# Patient Record
Sex: MALE | Race: WHITE
[De-identification: names, ages, dates, MRNs, and addresses within clinical notes are randomized per-mention and may not be internally consistent; named-entity substitution may affect disease eponyms.]

---

## 2021-10-14 ENCOUNTER — HOSPITAL ENCOUNTER (EMERGENCY)
Dept: HOSPITAL 97 - ER | Age: 51
Discharge: HOME | End: 2021-10-14
Payer: COMMERCIAL

## 2021-10-14 VITALS — OXYGEN SATURATION: 99 % | DIASTOLIC BLOOD PRESSURE: 95 MMHG | SYSTOLIC BLOOD PRESSURE: 141 MMHG

## 2021-10-14 VITALS — TEMPERATURE: 98.7 F

## 2021-10-14 DIAGNOSIS — I10: Primary | ICD-10-CM

## 2021-10-14 LAB
ALBUMIN SERPL BCP-MCNC: 4.2 G/DL (ref 3.4–5)
ALP SERPL-CCNC: 60 U/L (ref 45–117)
ALT SERPL W P-5'-P-CCNC: 49 U/L (ref 12–78)
AST SERPL W P-5'-P-CCNC: 17 U/L (ref 15–37)
BUN BLD-MCNC: 20 MG/DL (ref 7–18)
GLUCOSE SERPLBLD-MCNC: 126 MG/DL (ref 74–106)
HCT VFR BLD CALC: 44.5 % (ref 39.6–49)
INR BLD: 1.04
LIPASE SERPL-CCNC: 143 U/L (ref 73–393)
LYMPHOCYTES # SPEC AUTO: 1.8 K/UL (ref 0.7–4.9)
MAGNESIUM SERPL-MCNC: 2.3 MG/DL (ref 1.8–2.4)
NT-PROBNP SERPL-MCNC: 11 PG/ML (ref ?–125)
PMV BLD: 7.6 FL (ref 7.6–11.3)
POTASSIUM SERPL-SCNC: 4.1 MMOL/L (ref 3.5–5.1)
RBC # BLD: 5.24 M/UL (ref 4.33–5.43)
TROPONIN (EMERG DEPT USE ONLY): < 0.02 NG/ML (ref 0–0.04)

## 2021-10-14 PROCEDURE — 93005 ELECTROCARDIOGRAM TRACING: CPT

## 2021-10-14 PROCEDURE — 85025 COMPLETE CBC W/AUTO DIFF WBC: CPT

## 2021-10-14 PROCEDURE — 74175 CTA ABDOMEN W/CONTRAST: CPT

## 2021-10-14 PROCEDURE — 96374 THER/PROPH/DIAG INJ IV PUSH: CPT

## 2021-10-14 PROCEDURE — 71275 CT ANGIOGRAPHY CHEST: CPT

## 2021-10-14 PROCEDURE — 83690 ASSAY OF LIPASE: CPT

## 2021-10-14 PROCEDURE — 36415 COLL VENOUS BLD VENIPUNCTURE: CPT

## 2021-10-14 PROCEDURE — 93306 TTE W/DOPPLER COMPLETE: CPT

## 2021-10-14 PROCEDURE — 84484 ASSAY OF TROPONIN QUANT: CPT

## 2021-10-14 PROCEDURE — 71045 X-RAY EXAM CHEST 1 VIEW: CPT

## 2021-10-14 PROCEDURE — 80048 BASIC METABOLIC PNL TOTAL CA: CPT

## 2021-10-14 PROCEDURE — 83880 ASSAY OF NATRIURETIC PEPTIDE: CPT

## 2021-10-14 PROCEDURE — 83735 ASSAY OF MAGNESIUM: CPT

## 2021-10-14 PROCEDURE — 99285 EMERGENCY DEPT VISIT HI MDM: CPT

## 2021-10-14 PROCEDURE — 85610 PROTHROMBIN TIME: CPT

## 2021-10-14 PROCEDURE — 80076 HEPATIC FUNCTION PANEL: CPT

## 2021-10-14 NOTE — RAD REPORT
EXAM DESCRIPTION:  CT - Angio  Aorta For Dissection - 10/14/2021 4:02 pm

 

CLINICAL HISTORY:  Chest pain radiating to the back.

Dissection;PE

 

COMPARISON:  No comparisons

 

TECHNIQUE:  CT angiography of the aorta was performed with MIPs.

 

All CT scans are performed using dose optimization technique as appropriate and may include automated
 exposure control or mA/KV adjustment according to patient size.

 

FINDINGS:  A left aortic arch is present with normal branching pattern of the great vessels.No acute 
aortic finding is seen such as aneurysm, penetrating ulcer or dissection.  The celiac axis, SMA, EMMANUEL 
and renal arteries are patent.

 

No evidence of pulmonary embolism.

 

The lungs are clear.

 

The liver demonstrates no focal mass or biliary dilatation.Small fat containing umbilical hernia.The 
spleen, pancreas, adrenal glands and kidneys are within normal limits for arterial phase imaging.

 

No bowel obstruction, free fluid or abscess.Normal appendix.No pathologic enlarged lymphadenopathy id
entified.

 

Lower lumbar degenerative changes are present.

 

 

IMPRESSION:  No acute aortic finding is demonstrated.

## 2021-10-14 NOTE — EDPHYS
Physician Documentation                                                                           

 [facility]                                                                 

Name: Abundio Kapoor                                                                                

Age: 51 yrs                                                                                       

Sex: Male                                                                                         

: 1970                                                                                   

MRN: [id number]                                                                                   

Arrival Date: 10/14/2021                                                                          

Time: 12:09                                                                                       

Account#: [id number]                                                                            

Bed 26                                                                                            

Private MD:                                                                                       

Brayden Hdez                                                                      

HPI:                                                                                              

10/14                                                                                             

12:12 This 51 yrs old  Male presents to ER via [facility] with complaints of CHEST   otto 

      PAIN, PALPITATIONS.                                                                         

12:12 The patient or guardian reports chest pain that is located primarily in the substernal  otto 

      area. Onset: 1 week(s) ago. The patient presents with a history of irregular heart          

      beat, heart skipping beats. Context: The symptoms occur with light activity. Onset: The     

      symptoms/episode began/occurred 1 week(s) ago. Duration: The patient or guardian            

      reports multiple episodes, with no pattern. Modifying factors: The symptoms are             

      aggravated by nothing. The symptoms are alleviated by nothing. The pain does not            

      radiate. Associated signs and symptoms: Pertinent positives: chest pain,                    

      lightheadedness. The chest pain is described as sharp.                                      

                                                                                                  

Historical:                                                                                       

- Allergies:                                                                                      

12:35 No Known Allergies;                                                                     ld1 

- Home Meds:                                                                                      

12:35 None [Active];                                                                          ld1 

- PMHx:                                                                                           

12:35 None;                                                                                   ld1 

- PSHx:                                                                                           

12:35 None;                                                                                   ld1 

                                                                                                  

- Immunization history:: Adult Immunizations up to date, Client reports receiving the             

  2nd dose of the Covid vaccine.                                                                  

- Social history:: Smoking status: Patient denies any tobacco usage or history of.                

- Family history:: not pertinent.                                                                 

                                                                                                  

                                                                                                  

ROS:                                                                                              

12:12 Constitutional: Negative for fever, chills, and weight loss, Eyes: Negative for injury, otto 

      pain, redness, and discharge, ENT: Negative for injury, pain, and discharge, Neck:          

      Negative for injury, pain, and swelling, Respiratory: Negative for shortness of breath,     

      cough, wheezing, and pleuritic chest pain, Abdomen/GI: Negative for abdominal pain,         

      nausea, vomiting, diarrhea, and constipation, Back: Negative for injury and pain, :       

      Negative for injury, bleeding, discharge, and swelling, MS/Extremity: Negative for          

      injury and deformity, Skin: Negative for injury, rash, and discoloration, Neuro:            

      Negative for headache, weakness, numbness, tingling, and seizure, Psych: Negative for       

      depression, anxiety, suicide ideation, homicidal ideation, and hallucinations,              

      Allergy/Immunology: Negative for hives, rash, and allergies, Endocrine: Negative for        

      neck swelling, polydipsia, polyuria, polyphagia, and marked weight changes,                 

      Hematologic/Lymphatic: Negative for swollen nodes, abnormal bleeding, and unusual           

      bruising.                                                                                   

12:12 Cardiovascular: Positive for chest pain, with cough.                                        

                                                                                                  

Exam:                                                                                             

12:12 Constitutional:  This is a well developed, well nourished patient who is awake, alert,  otto 

      and in no acute distress. Head/Face:  Normocephalic, atraumatic. Eyes:  Pupils equal        

      round and reactive to light, extra-ocular motions intact.  Lids and lashes normal.          

      Conjunctiva and sclera are non-icteric and not injected.  Cornea within normal limits.      

      Periorbital areas with no swelling, redness, or edema. ENT:  Nares patent. No nasal         

      discharge, no septal abnormalities noted.  Tympanic membranes are normal and external       

      auditory canals are clear.  Oropharynx with no redness, swelling, or masses, exudates,      

      or evidence of obstruction, uvula midline.  Mucous membranes moist. Neck:  Trachea          

      midline, no thyromegaly or masses palpated, and no cervical lymphadenopathy.  Supple,       

      full range of motion without nuchal rigidity, or vertebral point tenderness.  No            

      Meningismus. Chest/axilla:  Normal chest wall appearance and motion.  Nontender with no     

      deformity.  No lesions are appreciated. Cardiovascular:  Regular rate and rhythm with a     

      normal S1 and S2.  No gallops, murmurs, or rubs.  Normal PMI, no JVD.  No pulse             

      deficits. Respiratory:  Lungs have equal breath sounds bilaterally, clear to                

      auscultation and percussion.  No rales, rhonchi or wheezes noted.  No increased work of     

      breathing, no retractions or nasal flaring. Abdomen/GI:  Soft, non-tender, with normal      

      bowel sounds.  No distension or tympany.  No guarding or rebound.  No evidence of           

      tenderness throughout. Back:  No spinal tenderness.  No costovertebral tenderness.          

      Full range of motion. Male :  Normal genitalia with no discharge or lesions. Skin:        

      Warm, dry with normal turgor.  Normal color with no rashes, no lesions, and no evidence     

      of cellulitis. MS/ Extremity:  Pulses equal, no cyanosis.  Neurovascular intact.  Full,     

      normal range of motion. Neuro:  Awake and alert, GCS 15, oriented to person, place,         

      time, and situation.  Cranial nerves II-XII grossly intact.  Motor strength 5/5 in all      

      extremities.  Sensory grossly intact.  Cerebellar exam normal.  Normal gait. Psych:         

      Awake, alert, with orientation to person, place and time.  Behavior, mood, and affect       

      are within normal limits.                                                                   

12:12 Musculoskeletal/extremity: Circulation is intact in all extremities. Sensation intact.      

      Compartment Syndrome exam of affected extremity: is normal. Joints: All joints appear       

      normal with full range of motion. DVT Exam: No signs of deep vein thrombosis. no pain,      

      no swelling, no tenderness, negative Homans' sign noted on exam, no appreciated bluish      

      discoloration, no erythema, no increased warmth.                                            

12:38 ECG was reviewed by the Attending Physician.                                            [facility] 

16:25 ECG was reviewed by the Attending Physician.                                            [facility] 

                                                                                                  

Vital Signs:                                                                                      

12:32  / 93; Pulse 68; Resp 18; Temp 98.7(O); Pulse Ox 100% on R/A; Weight 86.18 kg;    ld1 

      Height 5 ft. 9 in. (175.26 cm); Pain 0/10;                                                  

12:50  / 85; Pulse 72; Resp 18; Pulse Ox 100% on R/A;                                   ld1 

14:40  / 95; Pulse 67; Resp 18; Pulse Ox 99% ;                                          ld1 

12:32 Body Mass Index 28.06 (86.18 kg, 175.26 cm)                                             ld1 

                                                                                                  

MDM:                                                                                              

12:11 Patient medically screened.                                                             otto 

12:14 Differential diagnosis: abnormal EKG, coronary artery disease chest wall pain,          otto 

      congestive heart failure costochondritis, arrythmia, hiatal hernia, pancreatitis,           

      pulmonary embolus, stable angina, unstable angina. HEART Score: History: Slightly           

      Suspicious (0), ECG: Normal (0), Age: > 45 and < 65 years (1), Risk Factors: No Risk        

      Factors Known (0), Troponin: < or = 1 x Normal Limit (0). The patient was given aspirin     

      in the Emergency Department. The patient's deep vein thrombosis risk score was              

      calculated as follows: Total Score: 0. This patient was found to be at low risk for a       

      deep vein thrombosis by using the Well's assessment criteria. The patient's pulmonary       

      embolism risk score was calculated as follows: Total Score: 0-2 points. This patient        

      was found to be at low risk for a pulmonary embolism by using the Well's assessment         

      criteria. CRYSTAL Risk Score: TOTAL SCORE = 0. Data reviewed: vital signs, nurses notes,       

      lab test result(s), EKG, radiologic studies, plain films. Data interpreted: Cardiac         

      monitor: rate is 65 beats/min, rhythm is regular, Pulse oximetry: on room air is 96 %.      

                                                                                                  

10/14                                                                                             

12:12 Order name: Basic Metabolic Panel; Complete Time: 13:06                                 [facility] 

10/14                                                                                             

12:12 Order name: CBC with Diff; Complete Time: 13:06                                         [facility] 

10/14                                                                                             

12:12 Order name: LFT's; Complete Time: 13:06                                                 [facility] 

10/14                                                                                             

12:12 Order name: Magnesium; Complete Time: 13:06                                             [facility] 

10/14                                                                                             

12:12 Order name: NT PRO-BNP; Complete Time: 13:06                                            [facility] 

10/14                                                                                             

12:12 Order name: PT-INR; Complete Time: 13:06                                                [facility] 

10/14                                                                                             

12:12 Order name: Troponin (emerg Dept Use Only); Complete Time: 13:06                        [facility] 

10/14                                                                                             

12:12 Order name: XRAY Chest (1 view); Complete Time: 15:14                                   [facility] 

10/14                                                                                             

12:12 Order name: Lipase; Complete Time: 13:06                                                [facility] 

10/14                                                                                             

13:13 Order name: Troponin (Emerg Dept Use Only); Complete Time: 15:14                        [facility]

10/14                                                                                             

13:49 Order name: Echo w/ Doppler                                                               

10/14                                                                                             

15:24 Order name: CT Aorta for Dissection                                                     [facility] 

10/14                                                                                             

15:24 Order name: Troponin (emerg Dept Use Only): 400PM                                       [facility] 

10/14                                                                                             

12:12 Order name: EKG; Complete Time: 12:12                                                   [facility] 

10/14                                                                                             

12:12 Order name: Cardiac monitoring; Complete Time: 12:16                                    [facility] 

10/14                                                                                             

12:12 Order name: EKG - Nurse/Tech; Complete Time: 12:16                                      [facility] 

10/14                                                                                             

12:12 Order name: IV Saline Lock; Complete Time: 12:16                                        [facility] 

10/14                                                                                             

12:12 Order name: Labs collected and sent; Complete Time: 12:16                               [facility] 

10/14                                                                                             

12:12 Order name: O2 Per Protocol; Complete Time: 12:16                                       [facility] 

10/14                                                                                             

12:12 Order name: O2 Sat Monitoring; Complete Time: 12:16                                     [facility] 

                                                                                                  

EC:38 Rate is 63 beats/min. Rhythm is regular. QRS Axis is Normal. RI interval is normal. QRS otto 

      interval is normal. QT interval is normal. No Q waves. T waves are Normal. No ST            

      changes noted. Clinical impression: Normal ECG and No evidence of ischemia. Interpreted     

      by me. Reviewed by me.                                                                      

16:25 Rate is 61 beats/min. Rhythm is regular. QRS Axis is Normal. RI interval is normal. QRS otto 

      interval is normal. QT interval is normal. No Q waves. T waves are Normal. No ST            

      changes noted. Clinical impression: Normal ECG and No evidence of ischemia. Interpreted     

      by me. Reviewed by me.                                                                      

                                                                                                  

Administered Medications:                                                                         

12:27 Drug: NS 0.9% 1000 ml Route: IV; Rate: 1 bolus; Site: left antecubital;                 ld1 

12:27 Drug: Aspirin Chewable Tablet 324 mg Route: PO;                                         ld1 

15:34 Follow up: Response: No adverse reaction                                                ld1 

13:38 Drug: ToPROL XL (metoprolol SUCCINATE) 25 mg Route: PO;                                 ld1 

14:36 Follow up: Response: No adverse reaction                                                ld1 

15:34 Drug: Pepcid (famotidine) 20 mg Route: IVP; Site: left antecubital;                     ld1 

15:34 Drug: GI Cocktail without Donnatal - (Maalox Suspension 30 ml, Lidocaine Liquid 2 % 15  ld1 

      ml) Route: PO;                                                                              

                                                                                                  

                                                                                                  

Disposition Summary:                                                                              

10/14/21 13:13                                                                                    

Discharge Ordered                                                                                 

      Location: Home(10/14/21 13:13)                                                          otto 

      Problem: new(10/14/21 13:13)                                                            otto 

      Symptoms: have improved(10/14/21 13:13)                                                 otto 

      Condition: Stable(10/14/21 13:13)                                                       otto 

      Diagnosis                                                                                   

        - Chest pain, unspecified(10/14/21 13:13)                                             otto 

        - Essential (primary) hypertension                                                    otto 

      Followup:                                                                               otto 

        - With: Private Physician                                                                  

        - When: 2 - 3 days                                                                         

        - Reason: Recheck today's complaints, Continuance of care, Re-evaluation by your           

      physician                                                                                   

      Followup:                                                                               otto 

        - With: Christiano Ponce MD                                                                 

        - When: 2 - 3 days                                                                         

        - Reason: Recheck today's complaints, Re-evaluation by your physician                      

      Followup:                                                                               otto 

        - With: Mark Anthony Puga MD                                                                    

        - When: 2 - 3 days                                                                         

        - Reason: Recheck today's complaints, Re-evaluation by your physician                      

      Discharge Instructions:                                                                     

        - Discharge Summary Sheet                                                             otto 

        - Nonspecific Chest Pain, Adult                                                       otto 

        - Hypertension, Adult                                                                 otto 

        - Nonspecific Chest Pain, Adult, Easy-to-Read                                         otto 

        - Hypertension, Adult, Easy-to-Read                                                   otto 

        - Aspirin and Your Heart                                                              otto 

      Forms:                                                                                      

        - Medication Reconciliation Form                                                      otto 

        - Thank You Letter                                                                    otto 

        - Antibiotic Education                                                                otto 

        - Prescription Opioid Use                                                             [facility] 

      Prescriptions:                                                                              

        - Toprol XL 25 mg Oral Tablet                                                              

            - take 1 tablet by ORAL route once daily; 20 tablet; Refills: 0, Product          otto 

      Selection Permitted                                                                         

        - Pepcid 20 mg Oral Tablet                                                                 

            - take 1 tablet by ORAL route every 12 hours for 15 days; 30 tablet; Refills: 0,  otto 

      Product Selection Permitted                                                                 

Signatures:                                                                                       

Dispatcher [employer]                           EDMS                                                 

Brayden Curran MD MD cha Dibbern, Lauren, RN                     RN   ld1                                                  

                                                                                                  

Corrections: (The following items were deleted from the chart)                                    

12:36 12:35 Home Meds: Unable to obtain; ld1                                                  ld1 

13:11 13:08 Home [facility] hospital 

13:11 13:08 new Critical access hospital 

13:11 13:08 have improved Critical access hospital 

13:11 13:08 Stable Critical access hospital 

13:11 13:08 Chest pain, unspecified Critical access hospital 

13:31 13:13 TROPONIN (EMERG DEPT USE ONLY)+[email or web address] ordered. EDMS                            EDMS

                                                                                                  

**************************************************************************************************

## 2021-10-14 NOTE — ER
Nurse's Notes                                                                                     

 [facility]                                                                 

Name: Abundio Kapoor                                                                                

Age: 51 yrs                                                                                       

Sex: Male                                                                                         

: 1970                                                                                   

MRN: [id number]                                                                                   

Arrival Date: 10/14/2021                                                                          

Time: 12:09                                                                                       

Account#: [id number]                                                                            

Bed 26                                                                                            

Private MD:                                                                                       

Diagnosis: Chest pain, unspecified;Essential (primary) hypertension                               

                                                                                                  

Presentation:                                                                                     

10/14                                                                                             

12:32 Chief complaint: Patient states: "I was at work this morning when I began to feel a     ld1 

      dull pain in the top left of my chest. It progressed to both of my arms feeling ice         

      cold." Patient reports chest pains throughout the past week. Coronavirus screen: At         

      this time, the client does not indicate any symptoms associated with coronavirus-19.        

      [place] Screen: No symptoms or risks identified at this time. Initial Sepsis Screen: Does     

      the patient meet any 2 criteria? No. Patient's initial sepsis screen is negative. Does      

      the patient have a suspected source of infection? No. Patient's initial sepsis screen       

      is negative. Risk Assessment: Do you want to hurt yourself or someone else? Patient         

      reports no desire to harm self or others. Onset of symptoms was 2021.           

12:32 Method Of Arrival: EMS: [facility]                                                            ld1 

12:32 Acuity: DREW 3                                                                           ld1 

                                                                                                  

Triage Assessment:                                                                                

12:35 General: Appears in no apparent distress. comfortable, Behavior is calm, cooperative,   ld1 

      appropriate for age. Pain: Denies pain. EENT: No signs and/or symptoms were reported        

      regarding the EENT system. Neuro: Level of Consciousness is awake, alert, obeys             

      commands, Oriented to person, place, time, situation. Cardiovascular: Capillary refill      

      < 3 seconds Patient's skin is warm and dry. Rhythm is regular. Respiratory: Airway is       

      patent Respiratory effort is even, unlabored, Respiratory pattern is regular,               

      symmetrical. GI: Abdomen is flat, non-distended. : No signs and/or symptoms were          

      reported regarding the genitourinary system. Derm: No signs and/or symptoms reported        

      regarding the dermatologic system. Musculoskeletal: Reports pain in anterior aspect of      

      left upper chest Pt denies pain at this time. States he has been having chest pain          

      throughout the past week and this morning, but not at this current moment.                  

                                                                                                  

Historical:                                                                                       

- Allergies:                                                                                      

12:35 No Known Allergies;                                                                     ld1 

- Home Meds:                                                                                      

12:35 None [Active];                                                                          ld1 

- PMHx:                                                                                           

12:35 None;                                                                                   ld1 

- PSHx:                                                                                           

12:35 None;                                                                                   ld1 

                                                                                                  

- Immunization history:: Adult Immunizations up to date, Client reports receiving the             

  2nd dose of the Covid vaccine.                                                                  

- Social history:: Smoking status: Patient denies any tobacco usage or history of.                

- Family history:: not pertinent.                                                                 

                                                                                                  

                                                                                                  

Screenin:37 Abuse screen: Denies threats or abuse. Denies injuries from another. Nutritional        ld1 

      screening: No deficits noted. Tuberculosis screening: No symptoms or risk factors           

      identified. Fall Risk None identified.                                                      

                                                                                                  

Assessment:                                                                                       

12:37 Reassessment: See triage assessment.                                                    ld1 

13:30 Reassessment: Patient appears in no apparent distress at this time. No changes from     ld1 

      previously documented assessment. Patient and/or family updated on plan of care and         

      expected duration. Pain level reassessed. Patient is alert, oriented x 3, equal             

      unlabored respirations, skin warm/dry/pink.                                                 

14:40 Reassessment: Patient appears in no apparent distress at this time. Patient is alert,   ld1 

      oriented x 3, equal unlabored respirations, skin warm/dry/pink.                             

14:40 Reassessment: Patient appears in no apparent distress at this time. Patient and/or      ld1 

      family updated on plan of care and expected duration. Pain level reassessed. Patient is     

      alert, oriented x 3, equal unlabored respirations, skin warm/dry/pink. Pt requesting        

      further testing. Notified ERP. See orders.                                                  

                                                                                                  

Vital Signs:                                                                                      

12:32  / 93; Pulse 68; Resp 18; Temp 98.7(O); Pulse Ox 100% on R/A; Weight 86.18 kg;    ld1 

      Height 5 ft. 9 in. (175.26 cm); Pain 0/10;                                                  

12:50  / 85; Pulse 72; Resp 18; Pulse Ox 100% on R/A;                                   ld1 

14:40  / 95; Pulse 67; Resp 18; Pulse Ox 99% ;                                          ld1 

12:32 Body Mass Index 28.06 (86.18 kg, 175.26 cm)                                             ld1 

                                                                                                  

ED Course:                                                                                        

12:09 Patient arrived in ED.                                                                  [facility] 

12:11 Brayden Curran MD is Attending Physician.                                             [facility] 

12:32 Erlinda Palomo RN is Primary Nurse.                                                   ld1 

12:35 Triage completed.                                                                       ld1 

12:35 Arm band placed on right wrist.                                                         ld1 

12:37 Patient has correct armband on for positive identification. Placed in gown. Bed in low  ld1 

      position. Call light in reach. Side rails up X2. Cardiac monitor on. Pulse ox on. NIBP      

      on. Door closed. Noise minimized. Warm blanket given.                                       

12:37 No provider procedures requiring assistance completed. Maintain EMS IV. Dressing        ld1 

      intact. Good blood return noted. Site clean \T\ dry. Gauge \T\ site: 20 G LAC. Patient      

      maintains SpO2 saturation greater than 95% on room air.                                     

13:08 Christiano Ponce MD is Referral Physician.                                               [facility] 

13:13 Christiano Ponce MD is Referral Physician.                                               [facility] 

13:15 XRAY Chest (1 view) In Process Unspecified.                                             [facility]

14:36 Troponin (Emerg Dept Use Only) Sent.                                                    ld1 

15:28 Mark Anthony Puga MD is Referral Physician.                                                  [facility] 

16:02 CT Aorta for Dissection In Process Unspecified.                                         [facility]

16:54 IV discontinued, intact, bleeding controlled, No redness/swelling at site.              ld1 

                                                                                                  

Administered Medications:                                                                         

12:27 Drug: NS 0.9% 1000 ml Route: IV; Rate: 1 bolus; Site: left antecubital;                 ld1 

12:27 Drug: Aspirin Chewable Tablet 324 mg Route: PO;                                         ld1 

15:34 Follow up: Response: No adverse reaction                                                ld1 

13:38 Drug: ToPROL XL (metoprolol SUCCINATE) 25 mg Route: PO;                                 ld1 

14:36 Follow up: Response: No adverse reaction                                                ld1 

15:34 Drug: Pepcid (famotidine) 20 mg Route: IVP; Site: left antecubital;                     ld1 

15:34 Drug: GI Cocktail without Donnatal - (Maalox Suspension 30 ml, Lidocaine Liquid 2 % 15  ld1 

      ml) Route: PO;                                                                              

                                                                                                  

                                                                                                  

Outcome:                                                                                          

13:08 Discharge ordered by MD.                                                                [facility] 

13:13 Discharge ordered by MD.                                                                [facility] 

16:53 Discharged to home ambulatory.                                                          ld1 

16:53 Condition: stable                                                                           

16:53 Discharge instructions given to patient, Instructed on discharge instructions, follow       

      up and referral plans. medication usage, Demonstrated understanding of instructions,        

      follow-up care, medications, Prescriptions given X 2.                                       

16:54 Patient left the ED.                                                                    ld1 

                                                                                                  

Signatures:                                                                                       

Dispatcher [employer]                           EDBrayden Mann MD MD cha                                                  

Dibbern, Erlinda, RN                     RN   ld1                                                  

                                                                                                  

Corrections: (The following items were deleted from the chart)                                    

12:36 12:35 Home Meds: Unable to obtain; ld1                                                  ld1 

                                                                                                  

**************************************************************************************************

## 2021-10-15 NOTE — ECHO
HEIGHT: 5 ft 9 in   WEIGHT: 190 lb 0 oz   DATE OF STUDY: 10/14/2021   REFER DR: 
Brayden Curran MD

2-DIMENSIONAL: YES

     M.MODE: YES

 DOPPLER: YES

COLOR FLOW: YES



                    TDS:  NO

PORTABLE: NO

 DEFINITY:  NO

BUBBLE STUDY: NO





DIAGNOSIS:  CHEST PAIN



CARDIAC HISTORY:  

CATHERIZATION: NO

SURGERY: NO

PROSTHETIC VALVE: NO

PACEMAKER: NO





MEASUREMENTS (cm)

    DIASTOLIC (NORMALS)                 SYSTOLIC (NORMALS)

IVSd                 1.0 (0.6-1.2)                    LA Diam 1.9 (1.9-4.0)     LVEF       
  60-65%  

LVIDd               4.2 (3.5-5.7)                        LVIDs      2.6 (2.0-3.5)     %FS  
        39%

LVPWd             1.0 (0.6-1.2)

Ao Diam           2.9 (2.0-3.7)



2 DIMENSIONAL ASSESSMENT:

RIGHT ATRIUM:                   NORMAL

LEFT ATRIUM:       NORMAL



RIGHT VENTRICLE:            NORMAL

LEFT VENTRICLE: NORMAL



TRICUSPID VALVE:             NORMAL

MITRAL VALVE:     NORMAL



PULMONIC VALVE:             NORMAL

AORTIC VALVE:     NORMAL



PERICARDIAL EFFUSION: NONE

AORTIC ROOT:      NORMAL





LEFT VENTRICULAR WALL MOTION:     NORMAL



DOPPLER/COLOR FLOW:     NORMAL



COMMENTS:      LEFT VENTRICULAR EJECTION FRACTION 60-65%. NORMAL WALL MOTION. NORMAL 
STUDY.



TECHNOLOGIST:   GLORIA POWERS

## 2021-10-16 NOTE — EKG
Test Date:    2021-10-14               Test Time:    12:15:13

Technician:   ILDA                                    

                                                     

MEASUREMENT RESULTS:                                       

Intervals:                                           

Rate:         63                                     

OH:           198                                    

QRSD:         98                                     

QT:           404                                    

QTc:          413                                    

Axis:                                                

P:            43                                     

OH:           198                                    

QRS:          48                                     

T:            74                                     

                                                     

INTERPRETIVE STATEMENTS:                                       

                                                     

Normal sinus rhythm

Normal ECG

No previous ECG available for comparison



Electronically Signed On 10-16-21 11:45:08 CDT by Christiano Ponce

## 2021-10-16 NOTE — EKG
Test Date:    2021-10-14               Test Time:    16:22:24

Technician:   MAUDE                                   

                                                     

MEASUREMENT RESULTS:                                       

Intervals:                                           

Rate:         61                                     

SD:           204                                    

QRSD:         90                                     

QT:           400                                    

QTc:          402                                    

Axis:                                                

P:            29                                     

SD:           204                                    

QRS:          49                                     

T:            73                                     

                                                     

INTERPRETIVE STATEMENTS:                                       

                                                     

Normal sinus rhythm

Normal ECG

Compared to ECG 10/14/2021 12:15:13

No significant changes



Electronically Signed On 10-16-21 11:44:48 CDT by Christiano Ponce